# Patient Record
Sex: FEMALE | Race: WHITE
[De-identification: names, ages, dates, MRNs, and addresses within clinical notes are randomized per-mention and may not be internally consistent; named-entity substitution may affect disease eponyms.]

---

## 2018-05-26 NOTE — HP
PRIMARY CARE PHYSICIAN:  Erin Cerda MD

 

CHIEF COMPLAINT:  Chest pain.

 

HISTORY OF PRESENT ILLNESS:  This is an 84-year-old female with a history of 
hyperlipidemia, hypertension who presented with a chief complaint of chest pain 
described as originating from the stomach area and moving towards the left side 
of the chest, that lasted approximately 15 minutes and started in the afternoon 
prior to presentation..  This started while the patient was at rest and was not 
relieved by antacids, which the patient tried at home.

 

At the time of my evaluation, the patient is currently chest pain free and able 
to provide the history as per above.  Patient does make note that she had a 
prior heart catheterization "several years ago" and a prior ultrasound of her 
heart in the past 2 years without any evidence of coronary artery disease.  She 
also recalls a prior stress test in the past as well.

 

REVIEW OF SYSTEMS:  As per the HPI above and is otherwise negative.  Of note, 
the patient has a history of occasional reflux.  Describes this pain as vastly 
different from her reflux type pain.

 

PAST MEDICAL HISTORY:  As per HPI.  Includes;

1.  Hypertension.

2.  Hyperlipidemia.

3.  History of mitral valvular regurgitation.

4.  Status post appendectomy.

5.  Status post cholecystectomy.

6.  Status post hysterectomy.

 

HOME MEDICATIONS:  As per EMR, the patient's list seems to currently include 
the following;  Wellbutrin, probiotic, Zetia, Lipitor, Benicar, Toprol, aspirin
, magnesium, Benadryl, clonidine, amlodipine, and vitamin D3.

 

The patient denies any changes to this regimen within the last month.  Denies 
any new over-the-counter medications, herbal supplements, or vitamins other 
than those listed above in the last month.

 

ALLERGIES:  No known drug allergies.

 

FAMILY HISTORY:  No known family history of coronary artery disease.  The 
patient does have a family history of cancer, most specifically ovarian cancer.

 

SOCIAL HISTORY:  Patient denies any alcohol, tobacco, or illicit drug use.  She 
is accompanied today with a family member with her at bedside.  The patient 
endorses wishing to be FULL CODE at this point in time.

 

PHYSICAL EXAMINATION:

GENERAL:  The patient is awake, alert, appropriate, oriented x3, provides a 
history as per above and is in no acute distress, lying in the hospital bed.

HEENT:  Normocephalic, atraumatic, slightly dry mucous membranes.  Equal ocular 
motions are intact.  No posterior oropharyngeal erythema or exudate.

CARDIOVASCULAR:  S1, S2.  Pulses 2+ bilateral upper extremities.  No pitting 
pedal edema.

RESPIRATORY:  Reasonable air movement.  No conversational dyspnea.  No wheezes, 
rales, or rhonchi, otherwise clear to auscultation.

ABDOMEN:  Positive bowel sounds, soft, nontender to palpation.

MUSCULOSKELETAL:  Moving all 4 extremities.

 

LABORATORY DATA AND IMAGING:  EKG significant for an incomplete right bundle-
branch block.  WBC 4.5, hemoglobin 14.8, hematocrit 41.8, platelets 107,000.  
Sodium 141, potassium 3.2, chloride 104, bicarbonate 27, BUN 18, creatinine 0.78
, glucose 116.  Troponin 0.037 followed by 0.031.  UA is bland.

 

ASSESSMENT AND PLAN:  This is an 84-year-old female who presents with chest 
pain.

1.  Chest pain with risk factors, significant for hypertension and 
hyperlipidemia.

2.  Indeterminate troponin currently, repeat a third troponin in the morning.  
Continue the patient on her home regimen otherwise, we will order a stress test 
in the morning.

3.  Hypertension.  Continue the patient on her home regimen.

4.  Hyperlipidemia.  Continue the patient on her home regimen.

 

Admit the patient to observation status, FULL CODE.

 

Thank you for asking me to care for the patient.  Questions or concerns, 
contact me at [facility].

 

DAVID

## 2018-12-03 NOTE — BD
DEXA BONE DENSITY SCAN:

 

DATE: 12/3/2018.

 

COMPARISON: 

7/19/2012.

 

HISTORY: 

Postmenopausal female undergoing evaluation for osteoporosis.

 

FINDINGS: 

 

Lumbar Spine:       BMD (g/cm2)

    L1                0.615           T-Score: -3.4, previous T-Score: -3.1

    L2                0.625           T-Score: -3.7, previous T-Score: -2.9

    L3                0.622           T-Score: -4.2, previous T-Score: -3.5

    L4                0.589           T-Score: -4.3, previous T-Score: -3.5

 

    L1-L4             0.612           T-Score: -4.0, previous T-Score: -3.3

 

Femoral Neck:         0.557           T-Score: -2.6, previous T-Score: -1.8

 

Total Femur:          0.719           T-Score: -1.8, previous T-Score: -1.4

 

The FRAX-WHO fracture risk assessment total is not reported as T-Scores are at or below -2.5.

 

Impression:

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
 with a high associated risk for fracture.

 

POS: [facility]

## 2018-12-17 NOTE — ULT
ULTRASOUND RENAL BILATERAL STANDARD:

 

Date:  12/17/18 

 

HISTORY:  

Urinary tract infection. 

 

COMPARISON:  

None. 

 

FINDINGS:L

Right kidney measures 10.1 x 4.3 x 4.2 cm. Left kidney measures 9.6 x 5.7 x 4.9 cm. Pre-void urinary 
bladder volume is 115 mL. Post-void residual is 85 mL. 

 

Both ureteral jets are seen. No renal mass, hydronephrosis, or abnormal calcifications. 

 

IMPRESSION: 

Incomplete bladder emptying. 

 

 

POS: [facility]

## 2020-12-07 NOTE — DIS
PRIMARY CARE PHYSICIAN:  Erin Cerda MD

 

PRIMARY CARDIOLOGIST:  At the [facility], Vinh Neal MD

 

REASON FOR ADMISSION:  Chest pain.

 

DIAGNOSES ON DISCHARGE:

1.  Chest pain, resolved.

2.  Indeterminate troponins.

3.  History of coronary artery disease with previous normal stress tests.

4.  Hyperlipidemia.

5.  Hypertension.

6.  Mitral valve regurgitation.

 

PROCEDURES:  Attempted nuclear medicine stress test, the patient refused due to adverse effect of the
 radionucleotide injection.

 

CONSULTATIONS:  None.

 

SUMMARY OF HOSPITAL COURSE:  This is an 84-year-old white female with a known history of very mild co
ronary artery disease by cath many years ago.  She has had multiple stress tests over the years, alwa
ys has adverse reactions during and does not like them at all, most recently had transferred care to 
Dr. Neal at the [facility] where she had a PET cardiac scan.  She had bad reactions to the nuclear trace
rs for this scan as well and not just to this stress exams.  The patient came into the emergency room
, complaining of a 15-minute episode of sharp chest pain, starting in the epigastric region and movin
g over to the lower and then upper left chest, had some generalized weakness as well.  She stated terrance
t the pain felt similar to the pain she has had before that always resolves with antacids, but this t
polo it did not last for 15 minutes and then resolved spontaneously, so she came in to the emergency r
oom to be evaluated.  In the ER, she was found to have minimally indeterminate troponin 0.037, this s
table for 2 more checks.  She was put in observation and nuclear medicine stress test was ordered thi
s morning.  The patient went down for a nuclear medicine stress test, however, they injected the radi
onucleotide medication.  After they injected medication and put her into the waiting area, she felt s
uddenly weak all over, felt bad and then was worried that she will be too weak to be able to stand th
e stress portion of exam, so she refused to go further.  She did have some elevated blood pressures a
t that time and she has not had her blood pressure medications in the morning.  She came back up to [facility] floor, blood pressure of 190 systolic.  She was given her home medications and it resolved to 138/
63 and her weakness has resolved, but she is refusing further attempts at the stress, I did examine t
he patient again after she was admitted, the later the day of admission, she had no chest pain, she d
id have a little bit of chest wall tenderness on lower end of the left rib cage that reproduced her p
ain.  Her weakness had resolved.  She never had any focal weakness, just felt a generalized sense of 
weakness overall, given her a minimally indeterminate troponins and history of coronary artery diseas
e, I did recommend that we do a stress test; however, patient refused.  I then did discuss options of
 further workup in the hospital versus followup with her cardiologist, given her lack of further elev
ation of her troponin and her resolution, all symptoms were atypical in nature, the lowest as being a
n acute coronary artery syndrome.  The patient stated that she will get an appointment with her [facility]
ologist on Monday, will follow up closely with him.

 

DISCHARGE MANAGEMENT:  I did stress the patient, I cannot completely exclude this being coronary marya
ry disease as we unable to do to stress test and she expressed understanding of this and will follow 
up with her cardiologist and follow his recommendations, if wants to repeat her catheterization, she 
also understands to return to the emergency room immediately, she has recurrence of her chest pain.

 

DISCHARGE MANAGEMENT:  Discharge home.

 

FOLLOWUP:  Follow up with Dr. Neal in 3 days.

 

ACTIVITY:  As tolerated.

 

DIET:  Healthy heart diet.

 

MEDICATIONS:  The patient is to resume all of her previous home medications.

1.  Amlodipine 5 mg twice a day.

2.  Aspirin 81 mg daily.

3.  Atorvastatin 80 mg at night.

4.  Bupropion  mg at night.

5.  Vitamin D3 1000 units at night.

6.  Clonidine 0.1 mg every 6 hours as needed for systolic blood pressure greater than 180 or diastoli
c blood pressure greater than 100.

7.  Benadryl 25 mg at night.

8.  Zetia 10 mg at night.

9.  Hydrochlorothiazide 12.5 mg each morning.

10.  Probiotic 1 capsule twice a day.

11.  Magnesium 1000 mg each night.

12.  Toprol-XL 25 mg at night.

13.  Nitrofurantoin 50 mg at night.

14.  Benicar 50 mg each at night.
irregular

## 2021-11-25 NOTE — RAD
CHEST ONE VIEW:

 

History: Chest pain. 

 

Comparison: 6-8-2015

 

FINDINGS: 

Lungs are clear. No pneumothorax or effusion. Cardiac silhouette and mediastinal contour is within no
rmal limits. No acute osseous abnormality. 

 

IMPRESSION: 

No acute intrathoracic abnormality or significant change. 

 

POS: [facility] no

## 2022-01-20 ENCOUNTER — HOSPITAL ENCOUNTER (OUTPATIENT)
Dept: HOSPITAL 92 - CT | Age: 87
Discharge: HOME | End: 2022-01-20
Attending: FAMILY MEDICINE
Payer: MEDICARE

## 2022-01-20 DIAGNOSIS — S09.90XA: Primary | ICD-10-CM

## 2022-01-20 PROCEDURE — 70450 CT HEAD/BRAIN W/O DYE: CPT
